# Patient Record
Sex: MALE | Race: WHITE | Employment: STUDENT | URBAN - METROPOLITAN AREA
[De-identification: names, ages, dates, MRNs, and addresses within clinical notes are randomized per-mention and may not be internally consistent; named-entity substitution may affect disease eponyms.]

---

## 2018-03-02 ENCOUNTER — APPOINTMENT (OUTPATIENT)
Dept: LAB | Age: 23
End: 2018-03-02
Attending: FAMILY MEDICINE
Payer: COMMERCIAL

## 2018-03-02 ENCOUNTER — OFFICE VISIT (OUTPATIENT)
Dept: FAMILY MEDICINE CLINIC | Facility: CLINIC | Age: 23
End: 2018-03-02

## 2018-03-02 VITALS
WEIGHT: 174 LBS | SYSTOLIC BLOOD PRESSURE: 120 MMHG | HEIGHT: 72.5 IN | DIASTOLIC BLOOD PRESSURE: 78 MMHG | TEMPERATURE: 97 F | RESPIRATION RATE: 16 BRPM | BODY MASS INDEX: 23.31 KG/M2 | HEART RATE: 72 BPM

## 2018-03-02 DIAGNOSIS — N50.89 TESTICULAR NODULE: ICD-10-CM

## 2018-03-02 DIAGNOSIS — M79.89 SWELLING OF ARM: ICD-10-CM

## 2018-03-02 DIAGNOSIS — J02.9 SORE THROAT: Primary | ICD-10-CM

## 2018-03-02 LAB
ALBUMIN SERPL-MCNC: 4.5 G/DL (ref 3.5–4.8)
ALP LIVER SERPL-CCNC: 80 U/L (ref 45–117)
ALT SERPL-CCNC: 31 U/L (ref 17–63)
AST SERPL-CCNC: 20 U/L (ref 15–41)
BASOPHILS # BLD AUTO: 0.03 X10(3) UL (ref 0–0.1)
BASOPHILS NFR BLD AUTO: 0.8 %
BILIRUB SERPL-MCNC: 0.8 MG/DL (ref 0.1–2)
BUN BLD-MCNC: 12 MG/DL (ref 8–20)
CALCIUM BLD-MCNC: 9.4 MG/DL (ref 8.3–10.3)
CHLORIDE: 107 MMOL/L (ref 101–111)
CO2: 29 MMOL/L (ref 22–32)
CONTROL LINE PRESENT WITH A CLEAR BACKGROUND (YES/NO): YES YES/NO
CREAT BLD-MCNC: 1.11 MG/DL (ref 0.7–1.3)
EOSINOPHIL # BLD AUTO: 0.1 X10(3) UL (ref 0–0.3)
EOSINOPHIL NFR BLD AUTO: 2.7 %
ERYTHROCYTE [DISTWIDTH] IN BLOOD BY AUTOMATED COUNT: 12.3 % (ref 11.5–16)
GLUCOSE BLD-MCNC: 85 MG/DL (ref 70–99)
HCT VFR BLD AUTO: 46.3 % (ref 37–53)
HGB BLD-MCNC: 15.6 G/DL (ref 13–17)
IMMATURE GRANULOCYTE COUNT: 0 X10(3) UL (ref 0–1)
IMMATURE GRANULOCYTE RATIO %: 0 %
LYMPHOCYTES # BLD AUTO: 1.31 X10(3) UL (ref 0.9–4)
LYMPHOCYTES NFR BLD AUTO: 35.8 %
M PROTEIN MFR SERPL ELPH: 7.7 G/DL (ref 6.1–8.3)
MCH RBC QN AUTO: 29.1 PG (ref 27–33.2)
MCHC RBC AUTO-ENTMCNC: 33.7 G/DL (ref 31–37)
MCV RBC AUTO: 86.2 FL (ref 80–99)
MONOCYTES # BLD AUTO: 0.22 X10(3) UL (ref 0.1–1)
MONOCYTES NFR BLD AUTO: 6 %
NEUTROPHIL ABS PRELIM: 2 X10 (3) UL (ref 1.3–6.7)
NEUTROPHILS # BLD AUTO: 2 X10(3) UL (ref 1.3–6.7)
NEUTROPHILS NFR BLD AUTO: 54.7 %
PLATELET # BLD AUTO: 208 10(3)UL (ref 150–450)
POTASSIUM SERPL-SCNC: 4.5 MMOL/L (ref 3.6–5.1)
RBC # BLD AUTO: 5.37 X10(6)UL (ref 4.3–5.7)
RED CELL DISTRIBUTION WIDTH-SD: 38.9 FL (ref 35.1–46.3)
SODIUM SERPL-SCNC: 140 MMOL/L (ref 136–144)
STREP GRP A CUL-SCR: NEGATIVE
WBC # BLD AUTO: 3.7 X10(3) UL (ref 4–13)

## 2018-03-02 PROCEDURE — 36415 COLL VENOUS BLD VENIPUNCTURE: CPT | Performed by: FAMILY MEDICINE

## 2018-03-02 PROCEDURE — 80053 COMPREHEN METABOLIC PANEL: CPT | Performed by: FAMILY MEDICINE

## 2018-03-02 PROCEDURE — 87880 STREP A ASSAY W/OPTIC: CPT | Performed by: FAMILY MEDICINE

## 2018-03-02 PROCEDURE — 85025 COMPLETE CBC W/AUTO DIFF WBC: CPT | Performed by: FAMILY MEDICINE

## 2018-03-02 PROCEDURE — 99214 OFFICE O/P EST MOD 30 MIN: CPT | Performed by: FAMILY MEDICINE

## 2018-03-02 PROCEDURE — 86403 PARTICLE AGGLUT ANTBDY SCRN: CPT | Performed by: FAMILY MEDICINE

## 2018-03-02 NOTE — PATIENT INSTRUCTIONS
Keep good hydration. Monitor symptoms. Rest.  Call [phone] to schedule US  of the testicles. [facility] will call you with results when they are back.

## 2018-03-02 NOTE — PROGRESS NOTES
Princess Brown is a 25year old male. cc sore throat, swelling of the testicle, swelling arm  HPI:   Coming for evaluation. He said that he had a concussion. He is followed by his coaches at school. He said that sometimes he has a problem to  concentrate. distress,   SKIN: no rashes,no suspicious lesions  HEENT: atraumatic, normocephalic,ears irritation of the posterior throat, irritation of the nasal mucosa are clear,   NECK: supple,no adenopathy,  LUNGS: clear to auscultation  CARDIO: RRR without murmur

## 2018-03-03 LAB — MONOSCREEN: NEGATIVE

## (undated) NOTE — LETTER
March 23, 2018    Jarrett Rosalva  [address] [place] [address]      Dear Radha Lynch: The following are the results of your recent tests. Please review the list of test results.   Your result is the value on the left; we have also supplied the range Monocyte Absolute 0.22 0.10 - 1.00 x10(3) uL   Eosinophil Absolute 0.10 0.00 - 0.30 x10(3) uL   Basophil Absolute 0.03 0.00 - 0.10 x10(3) uL   Immature Granulocyte Absolute 0.00 0.00 - 1.00 x10(3) uL   Neutrophil % 54.7 %   Lymphocyte % 35.8 %   Monocyte %